# Patient Record
Sex: FEMALE | Race: BLACK OR AFRICAN AMERICAN | NOT HISPANIC OR LATINO | Employment: OTHER | URBAN - METROPOLITAN AREA
[De-identification: names, ages, dates, MRNs, and addresses within clinical notes are randomized per-mention and may not be internally consistent; named-entity substitution may affect disease eponyms.]

---

## 2017-09-20 ENCOUNTER — OFFICE VISIT (OUTPATIENT)
Dept: FAMILY MEDICINE | Facility: CLINIC | Age: 52
End: 2017-09-20
Payer: MEDICARE

## 2017-09-20 VITALS
TEMPERATURE: 98 F | DIASTOLIC BLOOD PRESSURE: 70 MMHG | WEIGHT: 131.19 LBS | HEART RATE: 71 BPM | RESPIRATION RATE: 14 BRPM | SYSTOLIC BLOOD PRESSURE: 132 MMHG | HEIGHT: 64 IN | BODY MASS INDEX: 22.4 KG/M2 | OXYGEN SATURATION: 99 %

## 2017-09-20 DIAGNOSIS — R76.0 ANTIPHOSPHOLIPID ANTIBODY POSITIVE: ICD-10-CM

## 2017-09-20 DIAGNOSIS — D70.3 NEUTROPENIA ASSOCIATED WITH ACQUIRED IMMUNE DEFICIENCY SYNDROME (AIDS): ICD-10-CM

## 2017-09-20 DIAGNOSIS — B20 NEUTROPENIA ASSOCIATED WITH ACQUIRED IMMUNE DEFICIENCY SYNDROME (AIDS): ICD-10-CM

## 2017-09-20 DIAGNOSIS — B20: Primary | ICD-10-CM

## 2017-09-20 DIAGNOSIS — Z86.73 CHRONIC CEREBROVASCULAR ACCIDENT: ICD-10-CM

## 2017-09-20 DIAGNOSIS — F02.80: Primary | ICD-10-CM

## 2017-09-20 PROCEDURE — 99999 PR PBB SHADOW E&M-EST. PATIENT-LVL III: CPT | Mod: PBBFAC,,, | Performed by: FAMILY MEDICINE

## 2017-09-20 PROCEDURE — 3075F SYST BP GE 130 - 139MM HG: CPT | Mod: S$GLB,,, | Performed by: FAMILY MEDICINE

## 2017-09-20 PROCEDURE — 3078F DIAST BP <80 MM HG: CPT | Mod: S$GLB,,, | Performed by: FAMILY MEDICINE

## 2017-09-20 PROCEDURE — 99499 UNLISTED E&M SERVICE: CPT | Mod: S$PBB,,, | Performed by: FAMILY MEDICINE

## 2017-09-20 PROCEDURE — 99215 OFFICE O/P EST HI 40 MIN: CPT | Mod: S$GLB,,, | Performed by: FAMILY MEDICINE

## 2017-09-20 PROCEDURE — 3008F BODY MASS INDEX DOCD: CPT | Mod: S$GLB,,, | Performed by: FAMILY MEDICINE

## 2017-09-20 RX ORDER — FLUCONAZOLE 200 MG/1
100 TABLET ORAL DAILY
COMMUNITY

## 2017-09-20 RX ORDER — AZITHROMYCIN 600 MG/1
1200 TABLET, FILM COATED ORAL
COMMUNITY

## 2017-09-20 RX ORDER — ATOVAQUONE 750 MG/5ML
750 SUSPENSION ORAL EVERY 12 HOURS
COMMUNITY

## 2017-09-20 RX ORDER — LAMIVUDINE 300 MG/1
300 TABLET, FILM COATED ORAL DAILY
COMMUNITY

## 2017-09-20 NOTE — PROGRESS NOTES
Subjective:       Patient ID: Shira Velasquez is a 52 y.o. female.    Chief Complaint: Dementia (this is a six month f/u)    HPI:  Here for routine follow up.  Followed by Roberto Latham at [facility] for AIDs dementia.  Patient with remote CVA and cerebrovascular loss.  Also with mild dyslipidemia.  No acute complaints today.  Appetite is good.  No recent weight loss.  No recent infections or rash.    Review of Systems   Constitutional: Negative for appetite change, chills, diaphoresis, fatigue and fever.   HENT: Positive for postnasal drip. Negative for trouble swallowing.    Eyes: Negative for visual disturbance.   Respiratory: Negative for cough, chest tightness, shortness of breath and wheezing.    Cardiovascular: Negative for chest pain, palpitations and leg swelling.   Gastrointestinal: Negative for abdominal pain, blood in stool, constipation, diarrhea, nausea and vomiting.   Genitourinary: Negative for difficulty urinating, dysuria, hematuria, pelvic pain and vaginal discharge.   Musculoskeletal: Negative for back pain, joint swelling and neck pain.   Skin: Negative for rash.   Neurological: Negative for dizziness, numbness and headaches.   Hematological: Negative for adenopathy. Does not bruise/bleed easily.   Psychiatric/Behavioral: Negative for dysphoric mood and sleep disturbance. The patient is not nervous/anxious.        Objective:      Physical Exam   Constitutional: She is oriented to person, place, and time. She appears well-developed and well-nourished.   Eyes: No scleral icterus.   Neck: Normal range of motion. Neck supple. No thyromegaly present.   Cardiovascular: Normal rate and regular rhythm.  Exam reveals no gallop and no friction rub.    No murmur heard.  Pulmonary/Chest: Effort normal and breath sounds normal. She has no wheezes. She has no rales.   Abdominal: Soft. Bowel sounds are normal. She exhibits no distension and no mass. There is no hepatosplenomegaly. There is no tenderness.    Musculoskeletal: She exhibits no edema.   Lymphadenopathy:     She has no cervical adenopathy.     She has no axillary adenopathy.        Right: No supraclavicular adenopathy present.        Left: No supraclavicular adenopathy present.   Neurological: She is alert and oriented to person, place, and time.   Skin: Skin is warm and dry. No rash noted.   Psychiatric: She has a normal mood and affect. Her behavior is normal.         Assessment:       1. AIDS dementia    2. Cryptococcal meningitis    3. Chronic cerebrovascular accident    4. 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 Antiphospholipid antibody positive        Plan:       AIDS dementia  Will obtain records from [facility].  Followed by infectious disease. Dementia appears to be stable at this time.      Chronic cerebrovascular accident  Clinically stable.  Will obtain labs to review lipids.  Will continue ASA daily.      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

## 2018-06-08 LAB — FECAL GLOBIN BY IMMUNOCHEM. (MEDICARE): NORMAL

## 2018-07-09 ENCOUNTER — OFFICE VISIT (OUTPATIENT)
Dept: FAMILY MEDICINE | Facility: CLINIC | Age: 53
End: 2018-07-09
Payer: MEDICARE

## 2018-07-09 VITALS
SYSTOLIC BLOOD PRESSURE: 110 MMHG | DIASTOLIC BLOOD PRESSURE: 70 MMHG | OXYGEN SATURATION: 99 % | WEIGHT: 127 LBS | TEMPERATURE: 98 F | HEART RATE: 85 BPM | HEIGHT: 64 IN | BODY MASS INDEX: 21.68 KG/M2

## 2018-07-09 DIAGNOSIS — D70.3 NEUTROPENIA ASSOCIATED WITH ACQUIRED IMMUNE DEFICIENCY SYNDROME (AIDS): ICD-10-CM

## 2018-07-09 DIAGNOSIS — D68.59 PROTEIN S DEFICIENCY: ICD-10-CM

## 2018-07-09 DIAGNOSIS — B20: ICD-10-CM

## 2018-07-09 DIAGNOSIS — I63.239 CEREBRAL INFARCTION DUE TO OCCLUSION OF CAROTID ARTERY, UNSPECIFIED BLOOD VESSEL LATERALITY: ICD-10-CM

## 2018-07-09 DIAGNOSIS — B20 NEUTROPENIA ASSOCIATED WITH ACQUIRED IMMUNE DEFICIENCY SYNDROME (AIDS): ICD-10-CM

## 2018-07-09 DIAGNOSIS — L84 CORN OR CALLUS: Primary | ICD-10-CM

## 2018-07-09 DIAGNOSIS — F02.80: ICD-10-CM

## 2018-07-09 DIAGNOSIS — B20 AIDS (ACQUIRED IMMUNODEFICIENCY SYNDROME), CD4 <=200: ICD-10-CM

## 2018-07-09 PROCEDURE — 3008F BODY MASS INDEX DOCD: CPT | Mod: CPTII,,, | Performed by: FAMILY MEDICINE

## 2018-07-09 PROCEDURE — 3074F SYST BP LT 130 MM HG: CPT | Mod: CPTII,,, | Performed by: FAMILY MEDICINE

## 2018-07-09 PROCEDURE — 3078F DIAST BP <80 MM HG: CPT | Mod: CPTII,,, | Performed by: FAMILY MEDICINE

## 2018-07-09 PROCEDURE — 99999 PR PBB SHADOW E&M-EST. PATIENT-LVL III: CPT | Mod: PBBFAC,,, | Performed by: FAMILY MEDICINE

## 2018-07-09 PROCEDURE — 99212 OFFICE O/P EST SF 10 MIN: CPT | Mod: S$PBB,,, | Performed by: FAMILY MEDICINE

## 2018-07-09 PROCEDURE — 99499 UNLISTED E&M SERVICE: CPT | Mod: S$GLB,,, | Performed by: FAMILY MEDICINE

## 2018-07-09 RX ORDER — QUETIAPINE FUMARATE 100 MG/1
TABLET, FILM COATED ORAL
Refills: 3 | COMMUNITY
Start: 2018-06-06

## 2018-07-09 RX ORDER — BICTEGRAVIR SODIUM, EMTRICITABINE, AND TENOFOVIR ALAFENAMIDE FUMARATE 50; 200; 25 MG/1; MG/1; MG/1
TABLET ORAL
Refills: 1 | COMMUNITY
Start: 2018-06-26 | End: 2018-07-09

## 2018-07-09 RX ORDER — DARUNAVIR ETHANOLATE AND COBICISTAT 800; 150 MG/1; MG/1
TABLET, FILM COATED ORAL
Refills: 1 | COMMUNITY
Start: 2018-04-24

## 2018-07-09 RX ORDER — ARIPIPRAZOLE 5 MG/1
TABLET ORAL
Refills: 11 | COMMUNITY
Start: 2018-07-03

## 2018-07-10 ENCOUNTER — TELEPHONE (OUTPATIENT)
Dept: FAMILY MEDICINE | Facility: CLINIC | Age: 53
End: 2018-07-10

## 2018-07-10 NOTE — PROGRESS NOTES
Subjective:       Patient ID: Shira Velasquez     Chief Complaint: Foot Injury (right foot pain)      Blair Velasquez is a 53 y.o. female. Patient presents with painful callus on left foot.  Reports removing foreign body from foot    Review of patient's allergies indicates:   Allergen Reactions    Crestor [rosuvastatin]     Iodine Hives       Current Outpatient Prescriptions:     aspirin 81 MG Chew, Take 81 mg by mouth once daily., Disp: , Rfl:     atovaquone (MEPRON) 750 mg/5 mL Susp, Take 750 mg by mouth every 12 (twelve) hours., Disp: , Rfl:     azithromycin (ZITHROMAX) 600 MG Tab, Take 1,200 mg by mouth every 7 days., Disp: , Rfl:     bismuth subsalicylate (PEPTO BISMOL) 262 mg/15 mL suspension, Take 15 mLs by mouth every 6 (six) hours as needed for Indigestion., Disp: , Rfl:     fluconazole (DIFLUCAN) 200 MG Tab, Take 100 mg by mouth once daily., Disp: , Rfl:     PREZCOBIX 800-150 mg-mg Tab, TK 1 T PO  D, Disp: , Rfl: 1    QUEtiapine (SEROQUEL) 100 MG Tab, , Disp: , Rfl: 3    adapalene (DIFFERIN) 0.1 % cream, Apply topically once daily., Disp: 45 g, Rfl: 3    ARIPiprazole (ABILIFY) 5 MG Tab, TK 1 T PO  D, Disp: , Rfl: 11    CERAMIDES 1,3,6-11 (CERAVE TOP), Apply topically., Disp: , Rfl:     DARUNAVIR-COBICISTAT ORAL, Take 1 tablet by mouth once daily., Disp: , Rfl:     dolutegravir 50 mg Tab, Take 50 mg by mouth once daily., Disp: , Rfl:     lamivudine (EPIVIR) 300 mg tablet, Take 300 mg by mouth once daily., Disp: , Rfl:     peg 400-propylene glycol (SYSTANE) 0.4-0.3 % Drop, Apply to eye 2 (two) times daily., Disp: , Rfl:     vitamin D 1000 units Tab, Take 2,000 Units by mouth once daily., Disp: , Rfl:     Past Medical History:   Diagnosis Date    AIDS (acquired immunodeficiency syndrome), CD4 <=200 7/16/2014    AIDS dementia 10/28/2016    Anemia of chronic disease 6/29/2012    Antiphospholipid antibody positive 5/1/2014    h/o APLS and elevated factor VIII was on coumadin for 5 years but  stopped 2/2 menstrual bleeding    Cerebral vascular disease 7/17/2014    Cryptococcal antigen detectable in cerebrospinal fluid 11/21/2016    Depression     HIV (human immunodeficiency virus infection)     Personal history of venous thrombosis and embolism 6/29/2012    Pulmonary cavitary lesion - Resolved 6/29/2014    CT 10-31-16: Interval resolution of the middle lobe cavitary mass, bilateral lower lobe opacities, and left lower lobe cyst.  No significant pulmonary disease.    Vitamin D deficiency disease     White coat hypertension 6/29/2014     Review of Systems    Objective:      Physical Exam   Musculoskeletal:        Right foot: There is no deformity.        Left foot: There is no deformity.        Feet:    Feet:   Right Foot:   Skin Integrity: Positive for callus. Negative for ulcer.   Left Foot:   Skin Integrity: Positive for callus. Negative for ulcer.      Assessment:       1. AIDS (acquired immunodeficiency syndrome), CD4 <=200    2. AIDS dementia    3. 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 Cerebral infarction due to occlusion of carotid artery, unspecified blood vessel laterality    5. Protein S deficiency        Plan:       Shira was seen today for foot injury.    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

## 2018-07-10 NOTE — TELEPHONE ENCOUNTER
----- Message from Rabia Feldman sent at 7/10/2018  9:53 AM CDT -----  Contact: Shira [phone]  Patient is requesting a call back from Dr. Jacinto in regards to her results. Please call at your earliest convenience.

## 2018-07-12 PROBLEM — B20 NEUTROPENIA ASSOCIATED WITH ACQUIRED IMMUNE DEFICIENCY SYNDROME (AIDS): Status: ACTIVE | Noted: 2018-07-12

## 2018-07-12 PROBLEM — D70.3 NEUTROPENIA ASSOCIATED WITH ACQUIRED IMMUNE DEFICIENCY SYNDROME (AIDS): Status: ACTIVE | Noted: 2018-07-12

## 2018-07-12 PROBLEM — D68.59 PROTEIN S DEFICIENCY: Status: ACTIVE | Noted: 2018-07-12

## 2018-07-12 PROBLEM — I63.239: Status: ACTIVE | Noted: 2018-07-12

## 2018-10-19 DIAGNOSIS — Z12.39 BREAST CANCER SCREENING: ICD-10-CM

## 2019-03-18 ENCOUNTER — TELEPHONE (OUTPATIENT)
Dept: ADMINISTRATIVE | Facility: HOSPITAL | Age: 54
End: 2019-03-18